# Patient Record
Sex: FEMALE | Race: WHITE | NOT HISPANIC OR LATINO | Employment: PART TIME | URBAN - NONMETROPOLITAN AREA
[De-identification: names, ages, dates, MRNs, and addresses within clinical notes are randomized per-mention and may not be internally consistent; named-entity substitution may affect disease eponyms.]

---

## 2022-04-21 ENCOUNTER — HOSPITAL ENCOUNTER (EMERGENCY)
Facility: HOSPITAL | Age: 52
Discharge: HOME | End: 2022-04-21
Attending: EMERGENCY MEDICINE | Admitting: EMERGENCY MEDICINE

## 2022-04-21 ENCOUNTER — APPOINTMENT (OUTPATIENT)
Dept: GENERAL RADIOLOGY | Facility: HOSPITAL | Age: 52
End: 2022-04-21

## 2022-04-21 VITALS
RESPIRATION RATE: 16 BRPM | HEART RATE: 63 BPM | OXYGEN SATURATION: 98 % | HEIGHT: 71 IN | WEIGHT: 250 LBS | SYSTOLIC BLOOD PRESSURE: 139 MMHG | DIASTOLIC BLOOD PRESSURE: 111 MMHG | BODY MASS INDEX: 35 KG/M2 | TEMPERATURE: 98.4 F

## 2022-04-21 DIAGNOSIS — S82.822A CLOSED TORUS FRACTURE OF DISTAL END OF LEFT FIBULA, INITIAL ENCOUNTER: ICD-10-CM

## 2022-04-21 DIAGNOSIS — S82.831A CLOSED AVULSION FRACTURE OF DISTAL END OF RIGHT FIBULA, INITIAL ENCOUNTER: Primary | ICD-10-CM

## 2022-04-21 PROCEDURE — 63710000001 ONDANSETRON ODT 4 MG TABLET DISPERSIBLE: Performed by: PHYSICIAN ASSISTANT

## 2022-04-21 PROCEDURE — 99284 EMERGENCY DEPT VISIT MOD MDM: CPT

## 2022-04-21 PROCEDURE — 73610 X-RAY EXAM OF ANKLE: CPT

## 2022-04-21 RX ORDER — ONDANSETRON 4 MG/1
4 TABLET, ORALLY DISINTEGRATING ORAL ONCE
Status: COMPLETED | OUTPATIENT
Start: 2022-04-21 | End: 2022-04-21

## 2022-04-21 RX ORDER — HYDROCODONE BITARTRATE AND ACETAMINOPHEN 5; 325 MG/1; MG/1
1 TABLET ORAL EVERY 6 HOURS PRN
Qty: 12 TABLET | Refills: 0 | Status: SHIPPED | OUTPATIENT
Start: 2022-04-21 | End: 2022-04-29

## 2022-04-21 RX ORDER — ONDANSETRON 4 MG/1
4 TABLET, ORALLY DISINTEGRATING ORAL EVERY 6 HOURS PRN
Qty: 8 TABLET | Refills: 0 | Status: SHIPPED | OUTPATIENT
Start: 2022-04-21 | End: 2022-04-28

## 2022-04-21 RX ORDER — HYDROCODONE BITARTRATE AND ACETAMINOPHEN 5; 325 MG/1; MG/1
1 TABLET ORAL ONCE
Status: COMPLETED | OUTPATIENT
Start: 2022-04-21 | End: 2022-04-21

## 2022-04-21 RX ADMIN — HYDROCODONE BITARTRATE AND ACETAMINOPHEN 1 TABLET: 5; 325 TABLET ORAL at 13:38

## 2022-04-21 RX ADMIN — ONDANSETRON 4 MG: 4 TABLET, ORALLY DISINTEGRATING ORAL at 13:38

## 2022-04-21 NOTE — ED NOTES
At this time, KEMI Barrera requested to speak to Dr. Jordan. Message left at this time. Awaiting call back.

## 2022-04-21 NOTE — ED PROVIDER NOTES
"Subjective   Patient is a 52-year-old female with no reported past medical history presenting to the ER for evaluation of fall and ankle pain.  Patient states she was walking when she accidentally stepped into a hole with her right ankle.  She twisted that and then overcompensated with her left ankle and twisted the left ankle even worse.  She states that she fell but did not strike her head.  She states that she was unable to stand and put weight on the left ankle after the injury.  She states she does have some pain of the right ankle but she still does have range of motion in that ankle.  She did strike her knee but denies any significant knee pain.  She denies any other symptoms, she arrived via EMS, has not had medication prior to arrival for pain.          Review of Systems   Constitutional: Negative for chills and fever.   HENT: Negative.    Eyes: Negative.    Respiratory: Negative.    Cardiovascular: Negative.    Gastrointestinal: Negative.    Genitourinary: Negative.    Musculoskeletal: Positive for arthralgias, joint swelling and myalgias.   Skin: Negative.    Neurological: Negative.    Psychiatric/Behavioral: Negative.        History reviewed. No pertinent past medical history.    No Known Allergies    History reviewed. No pertinent surgical history.    History reviewed. No pertinent family history.    Social History     Socioeconomic History   • Marital status: Single   Tobacco Use   • Smoking status: Current Every Day Smoker     Packs/day: 0.50     Types: Cigarettes   Substance and Sexual Activity   • Alcohol use: Not Currently           Objective   Physical Exam  Vitals and nursing note reviewed.     BP (!) 139/111   Pulse 63   Temp 98.4 °F (36.9 °C) (Oral)   Resp 16   Ht 180.3 cm (71\")   Wt 113 kg (250 lb)   SpO2 98%   BMI 34.87 kg/m²     GEN: No acute distress sitting upright in stretcher.  Awake and alert per does not appear septic or toxic.  She is answering questions appropriately.,   Head: " Normocephalic, atraumatic  Eyes: EOM intact  ENT: Mask in place per protocol   Cardiovascular: Regular rate and rhythm  Lungs: Clear to auscultation bilaterally without adventitious sounds  Extremities: Patient has edema of both ankles in the lower extremities, left ankle has significantly worse edema than the right, edema is on the lateral aspect.  There is decreased range of motion of the left ankle with flexion extension.  There is no tenderness over the left knee, no tenderness over the foot, calf or tibial plateau of the left lower extremity.  Right ankle has some mild tenderness to palpation over the lateral aspect.  There is some soft tissue swelling in the right ankle as well but not to the degree of the left ankle.  Dorsalis pedis pulses are palpable bilaterally  Neuro: GCS 15  Psych: Mood and affect are appropriate    Splint - Cast - Strapping    Date/Time: 4/21/2022 4:21 PM  Performed by: Holly Mueller PA-C  Authorized by: Jamari Ferrera DO     Consent:     Consent obtained:  Verbal    Consent given by:  Patient    Risks, benefits, and alternatives were discussed: yes      Risks discussed:  Discoloration, pain, numbness and swelling  Universal protocol:     Patient identity confirmed:  Verbally with patient  Pre-procedure details:     Distal neurologic exam:  Normal    Distal perfusion: distal pulses strong and brisk capillary refill    Procedure details:     Location:  Ankle    Ankle location:  L ankle    Strapping: no      Splint type:  Short leg and ankle stirrup    Supplies used: orthoglass.    Attestation: Splint applied and adjusted personally by me    Post-procedure details:     Distal neurologic exam:  Normal    Distal perfusion: brisk capillary refill      Procedure completion:  Tolerated    Post-procedure imaging: not applicable                 ED Course  ED Course as of 04/21/22 2006   Thu Apr 21, 2022   1414 PROCEDURE: XR ANKLE 3+ VW RIGHT-     History: Fall, right ankle pain, lateral  tenderness     COMPARISON: None.     FINDINGS:  A 3 view exam demonstrates a spiral fracture of the distal  fibula on the left. There is mild widening of the medial joint space.  There is a suspected avulsion fracture from the right distal fibula with  no other fractures seen.     IMPRESSION:  1. Spiral fracture of the distal fibula on the left with mild widening  of the medial joint space.  2. Avulsion fracture of the distal fibula on the right.              This report was signed and finalized on 4/21/2022 2:09 PM by Shikha Dudley M.D.. [LA]   1537 Dr. Curtis recommended the avulsion fracture side she can bear weight fully with an Aircast, recommended splint on the left lower side, nonweightbearing.  He states that she may want a walker [LA]      ED Course User Index  [LA] Holly Mueller PA-C                                                 MDM  Number of Diagnoses or Management Options  Closed avulsion fracture of distal end of right fibula, initial encounter  Closed torus fracture of distal end of left fibula, initial encounter  Diagnosis management comments: On arrival, patient stable.  Differential could include dislocation, or other concerns.  Will give pain and nausea medicine, will obtain x-rays of bilateral ankles.  Patient states that she struck her left knee but denies any significant pain, declined x-ray of the knee.    X-rays were reviewed an avulsion fracture of the distal right fibula and a spiral fracture of the distal left fibula.  Discussed with Dr. Ferrera.  Also will speak with Dr. Jordan given she has bilateral injuries.  He recommended for the avulsion we placed her and an Aircast and she can be full weightbearing.  He asked that we place her in a splint for her left spiral fibula fracture and be nonweightbearing.  He recommended follow-up, walker.  Will give pain and nausea control as well.  We discussed very strict return precautions.  She verbalized understanding and was in  agreement with this plan of care.       Amount and/or Complexity of Data Reviewed  Tests in the radiology section of CPT®: reviewed and ordered  Discussion of test results with the performing providers: yes  Review and summarize past medical records: yes  Discuss the patient with other providers: yes    Risk of Complications, Morbidity, and/or Mortality  Presenting problems: low  Diagnostic procedures: low  Management options: low    Patient Progress  Patient progress: stable      Final diagnoses:   Closed avulsion fracture of distal end of right fibula, initial encounter   Closed torus fracture of distal end of left fibula, initial encounter       ED Disposition  ED Disposition     ED Disposition   Discharge    Condition   Stable    Comment   --             Surya Jordan MD  [address] [facility]  [facility] [address]  [phone]    Schedule an appointment as soon as possible for a visit            Medication List      New Prescriptions    HYDROcodone-acetaminophen 5-325 MG per tablet  Commonly known as: NORCO  Take 1 tablet by mouth Every 6 (Six) Hours As Needed for Severe Pain for up to 3 days.     ondansetron ODT 4 MG disintegrating tablet  Commonly known as: ZOFRAN-ODT  Place 1 tablet on the tongue Every 6 (Six) Hours As Needed for Nausea or Vomiting for up to 2 days.           Where to Get Your Medications      These medications were sent to [facility]  [address] [place] [address] [address], [facility] [address]    Hours: 9AM-6PM Mon-Fri Phone: [phone]   · HYDROcodone-acetaminophen 5-325 MG per tablet  · ondansetron ODT 4 MG disintegrating tablet          Holly Mueller PA-C  04/21/22 2006

## 2022-04-21 NOTE — DISCHARGE INSTRUCTIONS
You have a small avulsion fracture at the tip of the right fibula and a spiral fracture of the left fibula.  Keep your splint clean, dry and in place on the left lower extremity until you follow-up with orthopedic surgery.  Use the Aircast on the right ankle.  Try to elevate your legs to help with swelling at home.  You can bear weight on the right ankle, but do not bear weight on the left ankle.  Use Zofran as needed for nausea and vomiting.  Use Norco as needed for severe pain.  Call Dr. Jordan's office first thing in the morning to establish outpatient follow-up.  Return to ER for any change, worsening symptoms, or any additional concerns.

## 2022-04-26 ENCOUNTER — OFFICE VISIT (OUTPATIENT)
Dept: ORTHOPEDIC SURGERY | Facility: CLINIC | Age: 52
End: 2022-04-26

## 2022-04-26 VITALS — TEMPERATURE: 97.6 F | HEIGHT: 71 IN | WEIGHT: 250 LBS | BODY MASS INDEX: 35 KG/M2

## 2022-04-26 DIAGNOSIS — S93.402A SPRAIN OF LEFT ANKLE, UNSPECIFIED LIGAMENT, INITIAL ENCOUNTER: ICD-10-CM

## 2022-04-26 DIAGNOSIS — S99.912A ANKLE INJURY, LEFT, INITIAL ENCOUNTER: Primary | ICD-10-CM

## 2022-04-26 DIAGNOSIS — S82.832A CLOSED FRACTURE OF DISTAL END OF LEFT FIBULA, UNSPECIFIED FRACTURE MORPHOLOGY, INITIAL ENCOUNTER: ICD-10-CM

## 2022-04-26 PROCEDURE — 99204 OFFICE O/P NEW MOD 45 MIN: CPT | Performed by: PHYSICIAN ASSISTANT

## 2022-04-26 NOTE — PROGRESS NOTES
Subjective   Patient ID: Ismael Matthews is a 52 y.o. right hand dominant female  Injury of the Right Ankle (States she hit uneven ground and fell 4/21/22, went to the ER same day. Presents in wheel chair. Left ankle is in a splint and the right is in a air cast brace.) and Injury of the Left Ankle         History of Present Illness    Patient presents as a new patient with complaints of bilateral ankle pain left greater than right.  She states she tripped on uneven ground and fell on 4/21/2022.  She was evaluated at [facility] ER diagnosed with a right ankle distal fibula avulsion fracture and a left distal fibula fracture.  She has been using a posterior splint to the left ankle and a right ankle Aircast stirrup splint.  She is having no pain to the right ankle.    Pain Score: 6  Pain Location: Ankle  Pain Orientation: Left     Pain Descriptors: Aching, Throbbing  Pain Frequency: Intermittent  Pain Onset: Sudden     Clinical Progression: Gradually improving  Aggravating Factors: Walking        Pain Intervention(s): Home medication, Cold applied (ibuprofen)  Result of Injury: Yes  Work-Related Injury: No    History reviewed. No pertinent past medical history.     History reviewed. No pertinent surgical history.    History reviewed. No pertinent family history.    Social History     Socioeconomic History   • Marital status: Single   Tobacco Use   • Smoking status: Current Every Day Smoker     Packs/day: 0.50     Types: Cigarettes   • Smokeless tobacco: Never Used   Substance and Sexual Activity   • Alcohol use: Not Currently         Current Outpatient Medications:   •  HYDROcodone-acetaminophen (NORCO) 5-325 MG per tablet, Take 1 tablet by mouth Every 6 (Six) Hours As Needed for Severe Pain for up to 3 days., Disp: 12 tablet, Rfl: 0    No Known Allergies    Review of Systems   Constitutional: Negative for fever.   HENT: Negative for dental problem and voice change.    Eyes: Negative for visual disturbance.  "  Respiratory: Negative for shortness of breath.    Cardiovascular: Negative for chest pain.   Gastrointestinal: Negative for abdominal pain.   Genitourinary: Negative for dysuria.   Musculoskeletal: Positive for arthralgias (bilateral ankle), gait problem (presents in wheelchair) and joint swelling (bilateral anjle).   Skin: Negative for rash.   Neurological: Negative for speech difficulty.   Hematological: Does not bruise/bleed easily.   Psychiatric/Behavioral: Negative for confusion.       I have reviewed the medical and surgical history, family history, social history, medications, and/or allergies, and the review of systems of this report.    Objective   Temp 97.6 °F (36.4 °C)   Ht 180.3 cm (70.98\")   Wt 113 kg (250 lb)   BMI 34.88 kg/m²    Physical Exam  Vitals and nursing note reviewed.   Constitutional:       Appearance: Normal appearance.   Cardiovascular:      Rate and Rhythm: Normal rate.      Pulses: Normal pulses.   Pulmonary:      Effort: Pulmonary effort is normal.   Abdominal:      General: There is no distension.   Musculoskeletal:      Right ankle: Normal pulse.      Right Achilles Tendon: No tenderness. Carty's test negative.      Left ankle: Tenderness present.      Right foot: Normal capillary refill. Normal pulse.      Left foot: Normal capillary refill. Normal pulse.   Neurological:      Mental Status: She is alert and oriented to person, place, and time.   Psychiatric:         Behavior: Behavior normal.       Ortho Exam   Extremity DVT signs are negative on physical exam with negative Linda sign, no calf pain, no palpable cords and no skin tone change   Neurologic Exam     Mental Status   Oriented to person, place, and time.               Assessment/Plan   Independent Review of Radiographic Studies:    No new imaging done today.  arrative & Impression   PROCEDURE: XR ANKLE 3+ VW RIGHT-     History: Fall, right ankle pain, lateral tenderness     COMPARISON: None.     FINDINGS:  A 3 view " exam demonstrates a spiral fracture of the distal  fibula on the left. There is mild widening of the medial joint space.  There is a suspected avulsion fracture from the right distal fibula with  no other fractures seen.     IMPRESSION:  1. Spiral fracture of the distal fibula on the left with mild widening  of the medial joint space.  2. Avulsion fracture of the distal fibula on the right.              This report was signed and finalized on 4/21/2022 2:09 PM by Shikha Dudley M.D..     X-ray of the left tib-fib 2 view performed in the office independently reviewed for the evaluation of pain after injury.  No comparison films available to review.  No acute fracture to the proximal fibula    Procedures       Diagnoses and all orders for this visit:    1. Ankle injury, left, initial encounter (Primary)  -     MRI Ankle Left Without Contrast; Future  -     XR Tibia Fibula 2 View Left    2. Closed fracture of distal end of left fibula, unspecified fracture morphology, initial encounter  -     MRI Ankle Left Without Contrast; Future  -     XR Tibia Fibula 2 View Left    3. Sprain of left ankle, unspecified ligament, initial encounter  -     MRI Ankle Left Without Contrast; Future  -     XR Tibia Fibula 2 View Left       Discussion of orthopedic goals  Risk, benefits, and merits of treatment alternatives reviewed with the patient and questions answered  The nature of the proposed surgery reviewed with the patient including risks, benefits, rehabilitation, recovery timeframe, and outcome expectations  Weight bearing parameters reviewed  Avoid offending activity  Ice, heat, and/or modalities as beneficial    Recommendations/Plan:  Patient is encouraged to call or return for any issues or concerns.  I did have a lengthy discussion with the patient regarding treatment regimen for the left ankle.  She may be a candidate for surgery if the MRI reveals ligamentous disruption causing the medial space widening.  Patient  instructed to use the Aircast brace on the right foot and ankle when ambulating and during sleep.  We will leave her in the posterior stirrup molded Ortho-Glass splint that was placed from the emergency room.  Patient needs MRi to assess the medial joint space widening this could be due to swelling from recent injury versus ligamentous disruption.  I will call patient with MRI results.   She may be a surgical candidate depending on MRi results.  Patient agreeable to call or return sooner for any concerns.        EMR Dragon-transcription disclaimer:  This encounter note is an electronic transcription of spoken language to printed text.  Electronic transcription of spoken language may permit erroneous or at times nonsensical words or phrases to be inadvertently transcribed.  Although I have reviewed the note for such errors, some may still exist

## 2022-04-27 ENCOUNTER — HOSPITAL ENCOUNTER (OUTPATIENT)
Dept: MRI IMAGING | Facility: HOSPITAL | Age: 52
Discharge: HOME | End: 2022-04-27
Admitting: PHYSICIAN ASSISTANT

## 2022-04-27 DIAGNOSIS — S82.832A CLOSED FRACTURE OF DISTAL END OF LEFT FIBULA, UNSPECIFIED FRACTURE MORPHOLOGY, INITIAL ENCOUNTER: ICD-10-CM

## 2022-04-27 DIAGNOSIS — S99.912A ANKLE INJURY, LEFT, INITIAL ENCOUNTER: ICD-10-CM

## 2022-04-27 DIAGNOSIS — S93.402A SPRAIN OF LEFT ANKLE, UNSPECIFIED LIGAMENT, INITIAL ENCOUNTER: ICD-10-CM

## 2022-04-27 PROCEDURE — 73721 MRI JNT OF LWR EXTRE W/O DYE: CPT

## 2022-04-27 NOTE — PROGRESS NOTES
I have reviewed the MRI results via telephone. I would like for her come back to clinic next week for a recheck. Can you help her get this appt next week?

## 2022-04-29 ENCOUNTER — PATIENT ROUNDING (BHMG ONLY) (OUTPATIENT)
Dept: ORTHOPEDIC SURGERY | Facility: CLINIC | Age: 52
End: 2022-04-29

## 2022-04-29 NOTE — PROGRESS NOTES
"April 29, 2022    Hello, may I speak with Ismael Matthews?    My name is Flora Vazquez      I am  with [facility]  [address] [place] [id number] [phone].    Before we get started may I verify your date of birth? 1970    I am calling to officially welcome you to our practice and ask about your recent visit. Is this a good time to talk? yes    Tell me about your visit with us. What things went well?  \"Visit went really well, everyone was friendly, they were thorough.\"       We're always looking for ways to make our patients' experiences even better. Do you have recommendations on ways we may improve?  No, \"but felt like office was hard to find.  The sign said ground floor but it was in the basement.\"    Overall were you satisfied with your first visit to our practice? yes       I appreciate you taking the time to speak with me today. Is there anything else I can do for you? no      Thank you, and have a great day.      "

## 2022-05-04 ENCOUNTER — OFFICE VISIT (OUTPATIENT)
Dept: ORTHOPEDIC SURGERY | Facility: CLINIC | Age: 52
End: 2022-05-04

## 2022-05-04 VITALS — HEIGHT: 71 IN | BODY MASS INDEX: 35 KG/M2 | TEMPERATURE: 97.6 F | WEIGHT: 250 LBS

## 2022-05-04 DIAGNOSIS — S82.832A CLOSED FRACTURE OF DISTAL END OF LEFT FIBULA, UNSPECIFIED FRACTURE MORPHOLOGY, INITIAL ENCOUNTER: ICD-10-CM

## 2022-05-04 DIAGNOSIS — S99.912A ANKLE INJURY, LEFT, INITIAL ENCOUNTER: Primary | ICD-10-CM

## 2022-05-04 DIAGNOSIS — S82.832A CLOSED FRACTURE OF DISTAL END OF LEFT FIBULA, UNSPECIFIED FRACTURE MORPHOLOGY, INITIAL ENCOUNTER: Primary | ICD-10-CM

## 2022-05-04 DIAGNOSIS — S99.912A ANKLE INJURY, LEFT, INITIAL ENCOUNTER: ICD-10-CM

## 2022-05-04 DIAGNOSIS — S93.402A RUPTURE OF LIGAMENT OF ANKLE, LEFT, INITIAL ENCOUNTER: ICD-10-CM

## 2022-05-04 PROCEDURE — 99213 OFFICE O/P EST LOW 20 MIN: CPT | Performed by: PHYSICIAN ASSISTANT

## 2022-05-04 NOTE — PROGRESS NOTES
"Subjective   Patient ID: Ismael Matthews is a 52 y.o. right hand dominant female  Follow-up of the Left Ankle (MRI results.)         History of Present Illness    Patient presents to review MRI results of the left ankle.  She is currently being treated for distal fibula left ankle fracture.  She has been immobilized in a stirrup posterior mold splint from the emergency room.  She states she is still having some pain but overall is doing better.  Denies chest pain or shortness of breath  The right ankle is doing much better per the patient.    History reviewed. No pertinent past medical history.     No past surgical history on file.    History reviewed. No pertinent family history.    Social History     Socioeconomic History   • Marital status: Single   Tobacco Use   • Smoking status: Current Every Day Smoker     Packs/day: 0.50     Types: Cigarettes   • Smokeless tobacco: Never Used   Substance and Sexual Activity   • Alcohol use: Not Currently       No current outpatient medications on file.    No Known Allergies    Review of Systems   Constitutional: Negative for fever.   HENT: Negative for dental problem and voice change.    Eyes: Negative for visual disturbance.   Respiratory: Negative for shortness of breath.    Cardiovascular: Negative for chest pain.   Gastrointestinal: Negative for abdominal pain.   Genitourinary: Negative for dysuria.   Musculoskeletal: Positive for arthralgias (left ankle), gait problem (presents in wheelchair ) and joint swelling (left ankle).   Skin: Negative for rash.   Neurological: Negative for speech difficulty.   Hematological: Does not bruise/bleed easily.   Psychiatric/Behavioral: Negative for confusion.       I have reviewed the medical and surgical history, family history, social history, medications, and/or allergies, and the review of systems of this report.    Objective   Temp 97.6 °F (36.4 °C)   Ht 180.3 cm (70.98\")   Wt 113 kg (250 lb)   BMI 34.88 kg/m²    Physical " Exam  Vitals and nursing note reviewed.   Constitutional:       Appearance: Normal appearance.   Pulmonary:      Effort: Pulmonary effort is normal.   Musculoskeletal:      Left ankle: Swelling present. No ecchymosis. Tenderness present over the lateral malleolus and ATF ligament. No CF ligament, posterior TF ligament, base of 5th metatarsal or proximal fibula tenderness. Anterior drawer test negative.      Left Achilles Tendon: Normal.      Left foot: Normal capillary refill. No tenderness or bony tenderness.   Neurological:      Mental Status: She is alert and oriented to person, place, and time.       Ortho Exam   Extremity DVT signs are negative on physical exam with negative Linda sign, no calf pain, no palpable cords and no skin tone change   Neurologic Exam     Mental Status   Oriented to person, place, and time.            Assessment/Plan   Independent Review of Radiographic Studies:    No new imaging done today.    PROCEDURE: MRI ANKLE LEFT  WO CONTRAST-     HISTORY: distal fibula fracture, medial joint space widening; possible  surgical candidate; S99.912A-Unspecified injury of left ankle, initial  encounter; S82.832A-Other fracture of upper and lower end of left  fibula, initial encounter for closed fracture; S93.402A-Sprain of  unspecified ligament of left ankle, initial encounter     TECHNIQUE: Multiplanar multisequence imaging of the ankle was performed  without the use of intravenous contrast.     FINDINGS: There is an obliquely oriented fracture of the distal fibula.  The anterior and posterior syndesmotic ligaments are intact. The  anterior talofibular ligament is torn. The deltoid ligament is intact.  The spring ligament is intact. The flexor and extensor tendons are  intact. The Achilles tendon is intact. The peroneal longus and brevis  tendons are intact. The visualized plantar fascia is normal. The  articular cartilage is preserved. Diffuse soft tissue swelling is seen  in the ankle.      IMPRESSION:  Obliquely oriented fracture of the distal fibula with an  anterior talofibular ligament tear. The syndesmotic ligaments are  intact.     This report was signed and finalized on 4/27/2022 9:12 AM by Shikha Dudley M.D..  Procedures       Diagnoses and all orders for this visit:    1. Ankle injury, left, initial encounter (Primary)    2. Closed fracture of distal end of left fibula, unspecified fracture morphology, initial encounter    3. Rupture of ligament of ankle, left, initial encounter       Discussion of orthopedic goals  Risk, benefits, and merits of treatment alternatives reviewed with the patient and questions answered  Weight bearing parameters reviewed  Ice, heat, and/or modalities as beneficial    Recommendations/Plan:  Exercise, medications, injections, other patient advice, and return appointment as noted.  Patient is encouraged to call or return for any issues or concerns.  Patient was provided a high tide pneumatic boot for toe-touch weightbearing.  Continue using walker.  May remove the boot to elevate and ice the skin.  Perform gentle nonweightbearing toe stretches and exercises to prevent DVT  Follow up in 3 weeks XOA    Patient agreeable to call or return sooner for any concerns.    EMR Dragon-transcription disclaimer:  This encounter note is an electronic transcription of spoken language to printed text.  Electronic transcription of spoken language may permit erroneous or at times nonsensical words or phrases to be inadvertently transcribed.  Although I have reviewed the note for such errors, some may still exist

## 2022-05-05 RX ORDER — HYDROCODONE BITARTRATE AND ACETAMINOPHEN 5; 325 MG/1; MG/1
1 TABLET ORAL EVERY 12 HOURS PRN
Qty: 14 TABLET | Refills: 0 | Status: SHIPPED | OUTPATIENT
Start: 2022-05-05 | End: 2022-06-07

## 2022-06-07 ENCOUNTER — OFFICE VISIT (OUTPATIENT)
Dept: ORTHOPEDIC SURGERY | Facility: CLINIC | Age: 52
End: 2022-06-07

## 2022-06-07 DIAGNOSIS — S93.402A SPRAIN OF LEFT ANKLE, UNSPECIFIED LIGAMENT, INITIAL ENCOUNTER: ICD-10-CM

## 2022-06-07 DIAGNOSIS — S82.832A CLOSED FRACTURE OF DISTAL END OF LEFT FIBULA, UNSPECIFIED FRACTURE MORPHOLOGY, INITIAL ENCOUNTER: Primary | ICD-10-CM

## 2022-06-07 DIAGNOSIS — S99.911D INJURY OF RIGHT ANKLE, SUBSEQUENT ENCOUNTER: ICD-10-CM

## 2022-06-07 PROCEDURE — 99213 OFFICE O/P EST LOW 20 MIN: CPT | Performed by: PHYSICIAN ASSISTANT

## 2022-06-07 NOTE — PROGRESS NOTES
Subjective   Patient ID: Ismael Matthews is a 52 y.o. right hand dominant female  Follow-up of the Left Ankle (DOI: 4/21/2022. States they are a lot better than they were. The left is worse than the right.) and Follow-up of the Right Ankle         History of Present Illness  Patient is following up to recheck the left and right ankle.   She reports overall she is doing better. She is excited to get out of the pneumatic boot.  Denies calf pain.                                                  No past medical history on file.     No past surgical history on file.    No family history on file.    Social History     Socioeconomic History   • Marital status: Single   Tobacco Use   • Smoking status: Current Every Day Smoker     Packs/day: 0.50     Types: Cigarettes   • Smokeless tobacco: Never Used   Substance and Sexual Activity   • Alcohol use: Not Currently       No current outpatient medications on file.    No Known Allergies    Review of Systems   Constitutional: Negative for fever.   HENT: Negative for dental problem and voice change.    Eyes: Negative for visual disturbance.   Respiratory: Negative for shortness of breath.    Cardiovascular: Negative for chest pain.   Gastrointestinal: Negative for abdominal pain.   Genitourinary: Negative for dysuria.   Musculoskeletal: Positive for arthralgias (bilateral ankle), gait problem (presents in wheel chair) and joint swelling (bilateral ankle).   Skin: Negative for rash.   Neurological: Negative for speech difficulty.   Hematological: Does not bruise/bleed easily.   Psychiatric/Behavioral: Negative for confusion.       I have reviewed the medical and surgical history, family history, social history, medications, and/or allergies, and the review of systems of this report.    Objective   There were no vitals taken for this visit.   Physical Exam  Vitals and nursing note reviewed.   Constitutional:       Appearance: Normal appearance.   Pulmonary:      Effort: Pulmonary effort  is normal.   Musculoskeletal:      Right ankle: No deformity, ecchymosis or lacerations. No tenderness. Anterior drawer test negative. Normal pulse.      Right Achilles Tendon: Normal.      Left ankle: No deformity, ecchymosis or lacerations. No tenderness. Decreased range of motion (stiffness). Normal pulse.      Left Achilles Tendon: Normal.      Right foot: Normal.      Left foot: Normal.   Neurological:      Mental Status: She is alert and oriented to person, place, and time.       Ortho Exam   Extremity DVT signs are negative on physical exam with negative Linda sign, no calf pain, no palpable cords and no skin tone change   Neurologic Exam     Mental Status   Oriented to person, place, and time.              Assessment & Plan   Independent Review of Radiographic Studies:    X-ray of the bilateral ankle 4 view performed in the office independently reviewed for the evaluation of left ankle fracture healing and right ankle pain evaluation.  Comparison films are available and reviewed.  The left ankle distal fibula reveals a well-healed distal fibula fracture without dislocation or interval displacement.  The right ankle reveals no acute fracture or dislocation.    Procedures       Diagnoses and all orders for this visit:    1. Closed fracture of distal end of left fibula, unspecified fracture morphology, initial encounter (Primary)  -     Ambulatory Referral to Home Health (Outpatient)    2. Sprain of left ankle, unspecified ligament, initial encounter  -     Ambulatory Referral to Home Health (Outpatient)    3. Injury of right ankle, subsequent encounter  -     XR Ankle 3+ View Bilateral; Future  -     Ambulatory Referral to Home Health (Outpatient)       Discussion of orthopedic goals  Risk, benefits, and merits of treatment alternatives reviewed with the patient and questions answered  Physical therapy referral given    Recommendations/Plan:  Exercise, medications, injections, other patient advice, and return  appointment as noted.  Patient is encouraged to call or return for any issues or concerns.    Continue using the right ankle aircast.  Patient was provided a left ankle aircast.  Enroll in PT  Follow up in 6 weeks    Patient agreeable to call or return sooner for any concerns.      EMR Dragon-transcription disclaimer:  This encounter note is an electronic transcription of spoken language to printed text.  Electronic transcription of spoken language may permit erroneous or at times nonsensical words or phrases to be inadvertently transcribed.  Although I have reviewed the note for such errors, some may still exist

## 2022-06-08 ENCOUNTER — HOME HEALTH ADMISSION (OUTPATIENT)
Dept: HOME HEALTH SERVICES | Facility: HOME HEALTHCARE | Age: 52
End: 2022-06-08

## 2022-06-09 ENCOUNTER — HOME CARE VISIT (OUTPATIENT)
Dept: HOME HEALTH SERVICES | Facility: HOME HEALTHCARE | Age: 52
End: 2022-06-09

## 2022-06-09 PROCEDURE — G0151 HHCP-SERV OF PT,EA 15 MIN: HCPCS

## 2022-06-11 VITALS
SYSTOLIC BLOOD PRESSURE: 123 MMHG | OXYGEN SATURATION: 98 % | RESPIRATION RATE: 16 BRPM | DIASTOLIC BLOOD PRESSURE: 70 MMHG | HEART RATE: 59 BPM

## 2022-06-11 NOTE — HOME HEALTH
"Pt is a 52 year old female with no significant PMH. 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 She states she hopes to get back to work as soon as possible. She states she tripped on uneven ground and fell on 4/21/2022.  She was evaluated at [facility] diagnosed with a right ankle distal fibula avulsion fracture and a left distal fibula fracture. Pt currently presents with decreased ankle strength and ROM limiting functional mobility.     SOC Note:    Home Health ordered for: PT    Reason for Hosp/Primary Dx/Co-morbidities: Bilateral fibula fractures    Focus of Care: improve ankle ROM, strength and endurance to improve overall functional mobility to return to PLOF and return to work    Current Functional status/mobility/DME: does have w/c at home which she uses intermittently; ambulates short distances with FWW    HB status/Living Arrangements: Lives in small house with 1 step up to bathroom    Skin Integrity/wound status: N/A    Code Status: Full    Fall Risk: High"

## 2022-06-15 ENCOUNTER — HOME CARE VISIT (OUTPATIENT)
Dept: HOME HEALTH SERVICES | Facility: HOME HEALTHCARE | Age: 52
End: 2022-06-15

## 2022-06-15 VITALS
TEMPERATURE: 97.6 F | OXYGEN SATURATION: 96 % | SYSTOLIC BLOOD PRESSURE: 122 MMHG | RESPIRATION RATE: 20 BRPM | DIASTOLIC BLOOD PRESSURE: 74 MMHG | HEART RATE: 74 BPM

## 2022-06-15 PROCEDURE — G0157 HHC PT ASSISTANT EA 15: HCPCS

## 2022-06-17 ENCOUNTER — HOME CARE VISIT (OUTPATIENT)
Dept: HOME HEALTH SERVICES | Facility: HOME HEALTHCARE | Age: 52
End: 2022-06-17

## 2022-06-17 PROCEDURE — G0157 HHC PT ASSISTANT EA 15: HCPCS

## 2022-06-17 NOTE — HOME HEALTH
Routine Visit Note: Patient was seated in wheelchair upon PTA arrival and pleasantly agreeable to PTA visit. She reports doing well but complained of post exercise muscle and L ankle soreness after visit. This has improved since and patient is eager to continue PT. Denies falls.    Skill/education provided: progression of single leg stance activities, BLE strength, bilateral ankle ROM. Provided safety and HEP education with written instructions.    Patient/caregiver response: Tolerated progression with slight discomfort increase to L ankle during full weight bearing activities. LOB x 1 during ambulation, was very unsteady but was able to self correct all. Voiced understanding to all education.    Plan for next visit: Ambulate outdoors, progress HEP    Other pertinent info: n/a

## 2022-06-21 ENCOUNTER — HOME CARE VISIT (OUTPATIENT)
Dept: HOME HEALTH SERVICES | Facility: HOME HEALTHCARE | Age: 52
End: 2022-06-21

## 2022-06-21 VITALS
RESPIRATION RATE: 16 BRPM | TEMPERATURE: 97 F | SYSTOLIC BLOOD PRESSURE: 115 MMHG | HEART RATE: 82 BPM | DIASTOLIC BLOOD PRESSURE: 83 MMHG | OXYGEN SATURATION: 97 %

## 2022-06-21 PROCEDURE — G0151 HHCP-SERV OF PT,EA 15 MIN: HCPCS

## 2022-06-21 PROCEDURE — G0157 HHC PT ASSISTANT EA 15: HCPCS

## 2022-06-22 NOTE — HOME HEALTH
Routine Visit Note: Patient was seated in rocking chair and pleasantly agreeable to PTA visit. She reports doing well despite lateral foot pain. Denies falls. Reports compliance with HEP.    Skill/education provided: Progressed gait outdoors, standing exercise for balance and BLE strength on balance foam.    Patient/caregiver response: Shows improvement in balance and ankle stability with air casts donned.    Plan for next visit: Progress all    Other pertinent info: n/a

## 2022-06-23 ENCOUNTER — HOME CARE VISIT (OUTPATIENT)
Dept: HOME HEALTH SERVICES | Facility: HOME HEALTHCARE | Age: 52
End: 2022-06-23

## 2022-06-23 VITALS
TEMPERATURE: 97.4 F | DIASTOLIC BLOOD PRESSURE: 90 MMHG | OXYGEN SATURATION: 97 % | HEART RATE: 86 BPM | SYSTOLIC BLOOD PRESSURE: 129 MMHG | RESPIRATION RATE: 16 BRPM

## 2022-06-23 PROCEDURE — G0157 HHC PT ASSISTANT EA 15: HCPCS

## 2022-06-28 ENCOUNTER — HOME CARE VISIT (OUTPATIENT)
Dept: HOME HEALTH SERVICES | Facility: HOME HEALTHCARE | Age: 52
End: 2022-06-28

## 2022-06-28 VITALS
OXYGEN SATURATION: 98 % | SYSTOLIC BLOOD PRESSURE: 133 MMHG | HEART RATE: 80 BPM | TEMPERATURE: 97.7 F | DIASTOLIC BLOOD PRESSURE: 81 MMHG | RESPIRATION RATE: 16 BRPM

## 2022-06-28 PROCEDURE — G0157 HHC PT ASSISTANT EA 15: HCPCS

## 2022-06-28 NOTE — HOME HEALTH
Routine Visit Note: Ismael was seated in rocking chair upon PTA arrival. She reports doing very well and decreased edema in LLE, and compliance with HEP. Denies pain or falls.    Skill/education provided: Skilled instruction and cues for dynamic and static balance, outdoor gait, and progression of BLE strengthening interventions.    Patient/caregiver response: Patient with improved outdoor gait stability and ankle ROM observed today. Reported fatigue to BLE at end of treatment.    Plan for next visit: continue progression, ambulate outdoors    Other pertinent info: n/a

## 2022-06-30 ENCOUNTER — HOME CARE VISIT (OUTPATIENT)
Dept: HOME HEALTH SERVICES | Facility: HOME HEALTHCARE | Age: 52
End: 2022-06-30

## 2022-06-30 VITALS
RESPIRATION RATE: 16 BRPM | TEMPERATURE: 97.4 F | DIASTOLIC BLOOD PRESSURE: 81 MMHG | HEART RATE: 86 BPM | SYSTOLIC BLOOD PRESSURE: 128 MMHG | OXYGEN SATURATION: 98 %

## 2022-06-30 PROCEDURE — G0157 HHC PT ASSISTANT EA 15: HCPCS

## 2022-07-05 ENCOUNTER — HOME CARE VISIT (OUTPATIENT)
Dept: HOME HEALTH SERVICES | Facility: HOME HEALTHCARE | Age: 52
End: 2022-07-05

## 2022-07-05 PROCEDURE — G0157 HHC PT ASSISTANT EA 15: HCPCS

## 2022-07-05 NOTE — HOME HEALTH
Routine Visit Note: Ismael was seated upon PTA arrival. She reports doing very well and denies falls. She states her foot is causing some discomfort but otherwise denies pain.    Skill/education provided: Skilled instruction and cues for outdoor ambulation to prepare return to work.    Patient/caregiver response: Shows great improvement in gait and balance evidenced by no LOB on unlevel surface and tolerating progression of distance during ambulation.    Plan for next visit: Reassessment.    Other pertinent info: n/a

## 2022-07-07 ENCOUNTER — HOME CARE VISIT (OUTPATIENT)
Dept: HOME HEALTH SERVICES | Facility: HOME HEALTHCARE | Age: 52
End: 2022-07-07

## 2022-07-07 VITALS
RESPIRATION RATE: 16 BRPM | HEART RATE: 80 BPM | DIASTOLIC BLOOD PRESSURE: 76 MMHG | OXYGEN SATURATION: 96 % | SYSTOLIC BLOOD PRESSURE: 127 MMHG

## 2022-07-07 PROCEDURE — G0151 HHCP-SERV OF PT,EA 15 MIN: HCPCS

## 2022-07-07 NOTE — HOME HEALTH
Pt discharged to [facility] with goals met this date. Improved ROM, strength, balance and overall functional mobility. Pt independent with household and outdoor mobility without device.

## 2022-07-19 ENCOUNTER — OFFICE VISIT (OUTPATIENT)
Dept: ORTHOPEDIC SURGERY | Facility: CLINIC | Age: 52
End: 2022-07-19

## 2022-07-19 VITALS — WEIGHT: 293 LBS | TEMPERATURE: 98.2 F | HEIGHT: 71 IN | BODY MASS INDEX: 41.02 KG/M2

## 2022-07-19 DIAGNOSIS — S82.832A CLOSED FRACTURE OF DISTAL END OF LEFT FIBULA, UNSPECIFIED FRACTURE MORPHOLOGY, INITIAL ENCOUNTER: Primary | ICD-10-CM

## 2022-07-19 DIAGNOSIS — S82.832D CLOSED FRACTURE OF DISTAL END OF LEFT FIBULA WITH ROUTINE HEALING, UNSPECIFIED FRACTURE MORPHOLOGY, SUBSEQUENT ENCOUNTER: Primary | ICD-10-CM

## 2022-07-19 DIAGNOSIS — S99.911D INJURY OF RIGHT ANKLE, SUBSEQUENT ENCOUNTER: ICD-10-CM

## 2022-07-19 DIAGNOSIS — S93.402A SPRAIN OF LEFT ANKLE, UNSPECIFIED LIGAMENT, INITIAL ENCOUNTER: ICD-10-CM

## 2022-07-19 PROCEDURE — 99212 OFFICE O/P EST SF 10 MIN: CPT | Performed by: PHYSICIAN ASSISTANT

## 2022-07-19 NOTE — PROGRESS NOTES
Subjective   Patient ID: Ismael Matthews is a 52 y.o. right hand dominant female  Follow-up of the Left Ankle (DOI: 4/21/2022. States she is doing good, a little unsteady at times. She was released from PT 7/13/2022. ) and Follow-up of the Right Ankle         History of Present Illness    Patient is following up for a scheduled kari for left ankle distal fibula fracture. DOI 4-21-22. She has been immobilized since her initial eval with our clinic. She just completed formal PT and is pleased with her overall progress. Denies pain. She would like to RTW asap.     History reviewed. No pertinent past medical history.     No past surgical history on file.    History reviewed. No pertinent family history.    Social History     Socioeconomic History   • Marital status: Single   Tobacco Use   • Smoking status: Current Every Day Smoker     Packs/day: 0.50     Types: Cigarettes   • Smokeless tobacco: Never Used   Substance and Sexual Activity   • Alcohol use: Not Currently         Current Outpatient Medications:   •  calcium carbonate (Calcium 600) 600 MG tablet, Take 600 mg by mouth Daily., Disp: , Rfl:   •  ibuprofen (Advil) 200 MG tablet, Take 200 mg by mouth Every 6 (Six) Hours As Needed for Mild Pain ., Disp: , Rfl:   •  multivitamin with minerals (DAILY MULTI 50+ PO), Take 1 tablet by mouth Daily., Disp: , Rfl:     No Known Allergies    Review of Systems   Constitutional: Negative for fever.   HENT: Negative for dental problem and voice change.    Eyes: Negative for visual disturbance.   Respiratory: Negative for shortness of breath.    Cardiovascular: Negative for chest pain.   Gastrointestinal: Negative for abdominal pain.   Genitourinary: Negative for dysuria.   Musculoskeletal: Positive for gait problem (presents with cane) and joint swelling (left ankle).   Skin: Negative for rash.   Neurological: Negative for speech difficulty.   Hematological: Does not bruise/bleed easily.   Psychiatric/Behavioral: Negative for  "confusion.        I have reviewed the medical and surgical history, family history, social history, medications, and/or allergies, and the review of systems of this report.    Objective   Temp 98.2 °F (36.8 °C)   Ht 180.3 cm (70.98\")   Wt 135 kg (297 lb 9.6 oz)   BMI 41.53 kg/m²    Physical Exam  Vitals and nursing note reviewed.   Constitutional:       Appearance: Normal appearance.   Pulmonary:      Effort: Pulmonary effort is normal.   Musculoskeletal:      Left ankle: No ecchymosis. No tenderness. Normal range of motion. Anterior drawer test negative. Normal pulse.      Left Achilles Tendon: No tenderness or defects. Carty's test negative.      Left foot: Normal capillary refill. No deformity or foot drop. Normal pulse.   Neurological:      Mental Status: She is alert and oriented to person, place, and time.       Ortho Exam   Extremity DVT signs are negative on physical exam with negative Linda sign, no calf pain, no palpable cords and no skin tone change   Neurologic Exam     Mental Status   Oriented to person, place, and time.            Assessment & Plan   Independent Review of Radiographic Studies:    X-ray of the left ankle 3 view performed in the office independently reviewed for the evaluation of distal fibula fracture healing.  Comparison films are available reviewed.  No evidence of interval displacement.  There is callus formation to suggest a healed fracture of the distal fibula      Procedures       Diagnoses and all orders for this visit:    1. Closed fracture of distal end of left fibula with routine healing, unspecified fracture morphology, subsequent encounter (Primary)    2. Sprain of left ankle, unspecified ligament, initial encounter       Orthopedic activities reviewed and patient expressed appreciation  Discussion of orthopedic goals  Risk, benefits, and merits of treatment alternatives reviewed with the patient and questions answered    Recommendations/Plan:  Exercise, medications, " injections, other patient advice, and return appointment as noted.  Patient is encouraged to call or return for any issues or concerns.  Avoid high-impact strenuous physical activity for the next 2 weeks.  Transition out of the Aircast stirrup brace over the next 7 to 10 days.  Hope with our clinic as needed  Patient agreeable to call or return sooner for any concerns.      EMR Dragon-transcription disclaimer:  This encounter note is an electronic transcription of spoken language to printed text.  Electronic transcription of spoken language may permit erroneous or at times nonsensical words or phrases to be inadvertently transcribed.  Although I have reviewed the note for such errors, some may still exist